# Patient Record
(demographics unavailable — no encounter records)

---

## 2024-11-12 NOTE — REVIEW OF SYSTEMS
[Fever] : no fever [Chills] : no chills [Fatigue] : no fatigue [Vision Problems] : no vision problems [Nasal Discharge] : nasal discharge [Chest Pain] : no chest pain [Palpitations] : no palpitations [Claudication] : no  leg claudication [Lower Ext Edema] : no lower extremity edema [Shortness Of Breath] : no shortness of breath [Wheezing] : no wheezing [Cough] : cough [Dyspnea on Exertion] : not dyspnea on exertion [Abdominal Pain] : no abdominal pain [Nausea] : no nausea [Constipation] : no constipation [Diarrhea] : no diarrhea [Vomiting] : no vomiting [Heartburn] : no heartburn [Melena] : no melena [Dysuria] : no dysuria [Incontinence] : no incontinence [Hesitancy] : no hesitancy [Nocturia] : no nocturia [Frequency] : no frequency [Joint Pain] : no joint pain [Joint Stiffness] : no joint stiffness [Headache] : no headache [Dizziness] : no dizziness [Fainting] : no fainting [Confusion] : confusion [Memory Loss] : no memory loss

## 2024-11-12 NOTE — ASSESSMENT
[FreeTextEntry1] : Patient basically doing well we will repeat laboratory work we will place him in the [facility] MAEVE screening program and he will be followed up after results of that are available to us he is given an influenza shot he is basically highly functional still playing softball he will start an exercise program he does have a stationary bike he has been advised to pedal this for approximately 30 minutes a day at least 5 days a week and to cut back on his diet and avoid sweets and fats and again we will follow-up on this within the next month or so time of visit 35 minutes

## 2024-11-12 NOTE — HISTORY OF PRESENT ILLNESS
[FreeTextEntry1] : Hypertension hyperlipidemia gout peripheral neuropathy [de-identified] : Patient seen in follow-up today on multiple issues also has had a lingering upper respiratory infection states for about the last 6 to 8 weeks which is improving but still he has a postnasal drip he does have a past history of GERD he is inquiring about being screened for obstructive sleep apnea as that he says it runs in his family and he certainly has some of the characteristics of people with MAEVE has been following with neurology regarding his peripheral neuropathy he has had multiple tests regarding amyloidosis including a cardiac scan which is negative a fat biopsy has been recommended to him but he is not sure that he would like to do this he is concerned about his weight and would like to lose some weight wonders if Ozempic might be the answer

## 2024-11-12 NOTE — PHYSICAL EXAM
[No Acute Distress] : no acute distress [Well Nourished] : well nourished [Well Developed] : well developed [Well-Appearing] : well-appearing [Normal Sclera/Conjunctiva] : normal sclera/conjunctiva [PERRL] : pupils equal round and reactive to light [Normal Outer Ear/Nose] : the outer ears and nose were normal in appearance [Normal Oropharynx] : the oropharynx was normal [No Lymphadenopathy] : no lymphadenopathy [No Respiratory Distress] : no respiratory distress  [No Accessory Muscle Use] : no accessory muscle use [Clear to Auscultation] : lungs were clear to auscultation bilaterally [Normal Rate] : normal rate  [Regular Rhythm] : with a regular rhythm [No Edema] : there was no peripheral edema [Soft] : abdomen soft [Non Tender] : non-tender [Non-distended] : non-distended [Normal Supraclavicular Nodes] : no supraclavicular lymphadenopathy [Normal Posterior Cervical Nodes] : no posterior cervical lymphadenopathy [Normal Anterior Cervical Nodes] : no anterior cervical lymphadenopathy [No CVA Tenderness] : no CVA  tenderness [No Rash] : no rash [Coordination Grossly Intact] : coordination grossly intact [No Focal Deficits] : no focal deficits

## 2024-12-09 NOTE — HISTORY OF PRESENT ILLNESS
[FreeTextEntry1] : The patient is an 87-year-old man who underwent a home sleep study, which was recommended due to a family history of sleep apnea. The patient reported no specific sleep-related symptoms but occasionally experiences heartburn at night, which is managed with medication as needed. The patient denies any episodes of sleepwalking, sleep talking, or acting out dreams. He experiences nocturnal awakenings twice per night to urinate and reports feeling slightly groggy upon waking in the morning. The patient does not nap regularly during the day and consumes one cup of coffee in the morning.  The home sleep study conducted on November 21, 2024, revealed moderate obstructive sleep apnea with an apnea-hypopnea index (AHI) of 21 events per hour. The patient spent 398 minutes asleep out of 468 minutes in bed. The patient does not feel excessively sleepy during the day and has an Bowlegs Sleepiness Scale score of 7. The patient is active, playing 80 to 90 games of softball a year, and has no significant daytime fatigue that affects his activities.  Sleep Schedule: - Time to bed: 11:00 PM - Wake up time: Between 7:00 AM and 7:30 AM - Nocturnal awakenings: Twice to urinate - Daytime sleepiness:  Bowlegs 7  Relevant Medications: - Medication for excess acid (heartburn) - No CPAP or other sleep apnea treatments currently in use  Past Medical History: - Coronary artery disease with three stents placed in December 2020 - High cholesterol - High blood pressure - Hearing impairment  Social History: - Never smoked - Consumes alcohol: Approximately four beers per week - Former , no marijuana use  Review of Systems: - Cardiovascular: History of coronary artery disease; no current chest pain - Respiratory: Normal pulmonary function test; prior lung infection treated with antibiotics - Gastrointestinal: Occasional heartburn - Musculoskeletal: Lower back pain, longstanding - Rest of review of systems were negative  Relevant data: - 2024-11-21: Home sleep study, total sleep time 398 minutes, time in bed 468 minutes, AHI 21, indicating moderate obstructive sleep apnea - Pulmonary Function Test: FEV1 102%, TLC 77%, DLCO 95%, indicating normal lung function  Family History: - Family history of sleep apnea

## 2024-12-09 NOTE — PLAN
[TextEntry] : The patient is an 87-year-old male with a past medical history of coronary artery disease, high cholesterol, and high blood pressure, who presented for evaluation of sleep apnea.  Moderate Obstructive Sleep Apnea: The patient has moderate MAEVE as diagnosed by the sleep study. The patient's clinical history and examination are supportive of these findings.  - APAP therapy was recommended to prevent long-term cardiovascular complications and improve sleep quality. - Discussed the use of APAP, including the types of masks available and the importance of nightly use. - Arrangements made for delivery and setup by a DME company. - Discussed potential benefits of APAP therapy, including reduced daytime grogginess. - The patient was hesitant with the PAP therapy, may need to explore other options such as oral appliances.   Follow up: The patient is advised to follow up in three months to review CPAP usage data and assess treatment efficacy.  The patient expressed understanding and agreement with the plan as outlined above and accepts responsibility to be compliant with any recommended testing, treatment, and follow-up visits. All relevant questions and concerns were addressed.   45 minutes of time were spent on the encounter. Medical records were reviewed, including but not limited to outpatient, sleep center, hospital records, laboratory data, and diagnostic imaging studies. Greater than 50% of the face-to-face encounter time was spent on counseling and/or coordination of care.

## 2024-12-09 NOTE — PHYSICAL EXAM
[TextEntry] : Constitutional: no acute distress   HEENT: normal oropharynx, Mallampati Class: II, + PND  Neck: normal appearance, no neck mass  Cardiac: normal rate/rhythm, normal s1, s2  Pulmonary: no resp distress, clear to auscultation bilaterally, no r/r/w Chest: no abnormalities Musculoskeletal: normal gait  Extremities: no clubbing, no cyanosis, no edema  Skin: normal color/ pigmentation  Psychiatric: oriented x3, normal affect

## 2025-03-14 NOTE — PHYSICAL EXAM
[de-identified] : Left wrist -Negative tenderness over the ECU tendon dorsally over the distal ulna, negative ECU Synergy test. No subluxation of the ECU tendon visible with pronosupination - There is directly tenderness over the ulnar fovea or TFCC.   - No DRUJ instability when tested and compared to the contralateral side, tested in pronation, neutral, and supination -  No tenderness over the LT interval negative LT ballottement test.  .  No pain with resisted or passive  pronosupination.   - No pain with power  position.  No tenderness over the SL interval anatomical snuffbox.or radial wrist - patient Is able make a full composite fist.   He has a negative Tinel's, negative Phalen's test, negative Durkan's test No Tinel's at the elbow over the ulnar nerve, no subluxation of the ulnar nerve with flexion extension No significant atrophy of the thenar or hypothenar musculature Digit warm well-perfused He reports diffuse numbness throughout his hand. No pain over CMC joint No tenderness over the STT joint Negative CMC grind [de-identified] : Three-view x-ray right hand including AP lateral and oblique were taken today and personally viewed these demonstrate mild degenerative changes of the first CMC joint

## 2025-03-14 NOTE — ASSESSMENT
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  Has been told he has carpal tunnel syndrome in the past.  He does report he was scheduled for surgery with another provider for carpal tunnel surgery. -He does not have any reproducible carpal tunnel syndrome symptoms on examination today.  He has an EMG from 2023 demonstrating cubital and carpal tunnel syndrome.  He reports he did not have any symptoms until a few months ago.  His symptoms he describes today are pain he points to the ulnar aspect of his wrist over his TFCC and he does have tenderness over this region as well. -We discussed the complexity of neuropathy especially in the setting of underlying neuropathy with EMG showing peripheral nerve compression as well.  He reports he received an injection by another provider 2 to 3 weeks ago.  He is unsure what he was injected with or the exact location. -At this time I would like to obtain the records from his prior provider to further provide better care for this patient, to see if he had a steroid injection into his carpal tunnel versus remainder of his wrist -He does not report any nighttime symptoms he is not frequently waking up at night,and is difficult to discern if his symptoms are coming from his underlying peripheral neuropathy  -We discussed surgery on his carpal tunnel not alleviate his ulnar wrist pain -At this time he is going to work with hand therapy, hand therapy prescription placed today.  He will follow-up in 4 weeks for repeat evaluation.  I recommend wearing her brace at night   35 minutes total time spent with the patient, including reviewing imaging, prior providers notes, relevant documentation, and history.  over 50% was spent directly counseling patient and  coordination of care.

## 2025-03-14 NOTE — HISTORY OF PRESENT ILLNESS
[FreeTextEntry1] : 87-year-old male presenting for evaluation right hand pain.  He has a complex medical history.  He was diagnosed with diffuse polyneuropathy has hand numbness bilaterally as well as foot numbness.  He has been evaluated extensively by a neurologist.  He did have a EMG in November 2023 demonstrating diffuse peripheral neuropathy that is idiopathic in nature as well as right-sided cubital tunnel and carpal tunnel.  He reports he has not had significant symptoms in his right hand or wrist until the past few months.  He has been experiencing significant pain that is alleviated by Aleve.  He points to the ulnar aspect of his wrist and reports the pain is worse with twisting type of motions.  He also has pain in his volar wrist.  He is trialed a wrist brace which does help his pain.  He does report diffuse numbness bilaterally throughout his hand.  He is 87 he lives home alone his wife passed away in 2018.  He reports that he is seen another provider, throughout the history does reveal that he was injected with something into his carpal tunnel he believes 3 weeks ago.  He does not report significant relief from the injection at this time.  He has not yet tried therapy.  He reports he is active fever play softball.

## 2025-04-10 NOTE — ASSESSMENT
[FreeTextEntry1] : Mr. Walsh is an 87-year-old with longstanding progressive sensory ataxia/polyneuropathy, carpal tunnel syndrome and low back pain.  He presents with subacute progressive bilateral wrist and hand pain and right hand weakness.  The symptoms are likely due to carpal tunnel syndrome.  I look forward to reviewing his wrist MRIs.  Assuming he undergoes carpal tunnel release, I request that adventitial tissue be sent for Congo red staining.  Regarding his sensorimotor polyneuropathy, I recommended a whole genome study to exclude RFC 1 mutation or other etiology.  I also requested a copy of his recent echo from Dr. Beto Hull's office for review regarding concerns regarding an infiltrative process.  Further management will depend upon these results and his clinical course.

## 2025-04-10 NOTE — HISTORY OF PRESENT ILLNESS
[FreeTextEntry1] : Mr. Jono Walsh returned to the office having been last seen on August 7, 2024.  He is an 87-year-old right-handed patient was diagnosed with polyneuropathy 2 decades ago when he presented with bilateral foot numbness.  This had gradually worsened over time.  He had difficulty feeling his feet and is unsteady.  He only experienced right hand numbness when talking on the telephone.  He denied weakness or cramping.  He complained of nocturia x2 and longstanding partial erectile dysfunction.  He had chronic low back pain without sciatica.  He is hard of hearing, a hereditary trait.  Blood tests were notable for normal B12 and folate levels.  TSH was 4.48.  Hemoglobin A1c was 6.  Creatinine was 1.74.  Nerve conduction studies and electromyography revealed a severe chronic axonal sensory motor polyneuropathy.  There was electrophysiologic evidence of median and ulnar neuropathies at the right wrist and elbow respectively.  MRI of the lumbar spine revealed multilevel spondylosis.  A comprehensive serologic evaluation was notable for a positive double-stranded DNA.  Immunoglobulin studies and TTR were negative.  A hereditary neuropathy panel was negative.  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  He was rheumatology consultation found no evidence of lupus.  At his May 2024 visit, he reported poor balance.  He denied weakness or postural lightheadedness.  He complained of urinary frequency.  He was diagnosed with nonaggressive prostate cancer.  No treatment was rendered.  He had numbness of both feet.  He experienced fluctuating numbness in his hands when he was talking on the phone or while sleeping.  At his August 2024 visit, he reported longstanding debilitating morning back pain and stiffness.  An MRI of the lumbar spine performed in 2023 revealed moderate multilevel lumbar spondylosis with multilevel degenerative disc disease and facet arthrosis resulting in lateral recess and foraminal narrowing.  He was evaluated by spinal surgeon who recommended pain management.  An echocardiogram performed in May 2022 revealed mild left ventricular hypertrophy.  Right ventricular size was normal.  I had suggested a technetium pyrophosphate scan to exclude wild-type cardiac TTR.  That study had not yet been performed.  There had been no change in his condition.  A technetium pyrophosphate scan performed in August 2024 was not consistent with TTR cardiac amyloidosis.  We discussed possibly a fat pad biopsy.  That study was not performed.  Since December 2024, he he complains of increasing pain and stiffness in his right more than left wrist and hands.  This occurs while sleeping and driving.  He was evaluated by Dr. Owens who injected his right wrist with lidocaine and steroids with some improvement.  He was subsequently seen by Dr. Jose A Vasquez who ordered MRIs of both wrists because of concern regarding TFCC.  He complains of right hand weakness.  Past surgical history is notable for cholecystectomy 2 shoulder procedures, left hand Dupuytren's contracture repair and cataract extractions.  He suffers from hypertension, prediabetes hyperlipidemia, coronary artery disease status post stenting and renal insufficiency.  There is no history of congestive heart failure, atrial fibrillation, pulmonary, hepatic, gastrointestinal, thyroid, hematologic or cerebrovascular disease.  He has no allergies.  Medications include metoprolol, amlodipine, torsemide, allopurinol, clopidogrel and atorvastatin.  He is a non-smoker and social drinker.  He is .  Family history is notable for a mother with stroke and a father with lung cancer.

## 2025-04-10 NOTE — CONSULT LETTER
[Dear  ___] : Dear  [unfilled], [Consult Letter:] : I had the pleasure of evaluating your patient, [unfilled]. [Please see my note below.] : Please see my note below. [Consult Closing:] : Thank you very much for allowing me to participate in the care of this patient.  If you have any questions, please do not hesitate to contact me. [Sincerely,] : Sincerely, [FreeTextEntry3] : Sergio Machuca MD\par   [DrMeir  ___] : Dr. DUQUE [DrMeir ___] : Dr. DUQUE

## 2025-04-10 NOTE — PHYSICAL EXAM
[FreeTextEntry1] : Constitutional:  Patient was well-developed, well-nourished and in no acute distress.   Head:  Normocephalic, atraumatic. Tympanic membranes were not examined.   Neck:  Supple with full range of motion.   Cardiovascular:  Cardiac rhythm was regular without murmur. There were no carotid bruits. Peripheral pulses were full and symmetric.   Respiratory:  Lungs were clear.   Abdomen:  Soft and nontender.   Spine:  Nontender.   Skin:  There were no rashes.   NEUROLOGICAL EXAMINATION:  Mental Status: Patient was alert and oriented. Speech was fluent. There was no dysarthria.   Cranial Nerves:   II: He could finger count bilaterally. Pupils were surgical but reactive. Visual fields were full.  Fundi were not examined.  III, IV, VI:  Eye movements were full without nystagmus.   V: Facial sensation was intact.   VII: Facial strength was normal.   VIII: Hearing was diminished bilaterally.  IX, X: Palatal movement was normal. Phonation was normal.   XI: Sternocleidomastoids and trapezii were normal.   XII: Tongue was midline and movements normal. There was no lingual atrophy or fasciculations.   Motor Examination: Muscle bulk, tone and strength were normal except for atrophy of the right thenar eminence with weakness of thumb abduction.  There was no tremor.  Sensory Examination: Vibration sense was absent in the feet and diminished at the knees.  Joint position sense was impaired in the toes.  There was shading of pinprick in a stocking glove distribution.  There was diminished pinprick in his right 1st, 2nd and 3rd fingers.  There were Tinel signs at both wrists.  Reflexes: DTRs were 1 at the biceps, 1+ at the knees and absent elsewhere.  Plantar Responses: Plantar responses were flexor.   Coordination/Cerebellar Function: There was no dysmetria on finger to nose or heel to shin testing.   Gait/Stance: Gait was normal.  He mildly swayed on Romberg testing.  Tandem was unsteady.

## 2025-04-10 NOTE — REVIEW OF SYSTEMS
[Numbness] : numbness [Tingling] : tingling [Abnormal Sensation] : an abnormal sensation [Difficulty Walking] : difficulty walking

## 2025-04-23 NOTE — ASSESSMENT
[FreeTextEntry1] : 1.  Right wrist carpal tunnel  - He has an EMG from 2023 this demonstrates cubital tunnel as well as carpal tunnel syndrome.  He does not have significant symptoms of this however he has a background severe unknown/idiopathic peripheral neuropathy.  Given the patient's age timelines.  He does have atrophy of his thenar compartment as well.   - I spoken with his neurologist, Dr. Machuca. he has been evaluated for amyloid for several years.  He is also known this patient he does think that there is atrophy that he has been seen progress since 2023 specifically in the thenar region.  He does have a positive Tinel's at this examination today.  In addition his prior hand surgeon had given him an injection in January.  He did report some degree of symptomatic relief from the carpal tunnel injection - I reviewed with carpal tunnel release would entail with Yamilex.  He has had a similar conversation with his prior hand surgeon as well.  Given the severity, the patient's age as well as the background peripheral neuropathy the main goal of the surgery would make sure that continued denervation does not persist.  He may not recover significant mount of function of his hand, and certainly the numbness especially with the background peripheral neuropathy is very on predictable if this will improve at all..  Jono understands this.  After speaking with his neurologist as well he would like a biopsy of the synovium to aid with his several year workup he has been undergoing for amyloid.  We will plan to send this for [place] red stain. - Details of surgery outlined in detail of right carpal tunnel release.  Risks of surgery including but not limited to infection, blood loss, damage to nerves blood vessels, tendons, muscle in the area, the risk of recurrence, risk of pillar pain, scar tenderness.  Stiffness.  In addition the pain that he is having of the TFCC region will not be improved and could in fact worsened due to the required positioning and the swelling in the area.  He understands that the numbness he has is likely not reversible with the carpal tunnel release especially in the setting of the background severe peripheral neuropathy. - This could medically help him significantly as well with the amyloid workup he has undergone extensively for - Plan for procedure right carpal tunnel release, right synovial biopsy, he verbally consented to procedure today any verbalized understanding of risk benefits alternatives the procedure he understands that continued nonoperative management is a valid management at this time.  Informed consent to be signed the day of the procedure - He has a history of cardiac disease.  He will require holding his Plavix for 5 to 7 days if this can be safely done.  He will follow-up with his primary care and cardiologist to make sure he is medically optimized for the procedure.  -His EMG from 2023 does not demonstrate significant left-sided symptoms especially of his peripheral neuropathy.  He did ask about his left-sided carpal tunnel today.  He does have examination of this.  We did discuss we can obtain a new EMG I would like him to see how he does from the surgery first to see if it causes him any significant symptom improvement and weakness of the left sided after sufficient time and healing is occurred.  He wants to play baseball and golf this summer.  2.  Bilateral TFCC injuries/tearing degenerative type - We discussed these are degenerative nature.  It is causing him significant symptoms at this time.  We discussed that degenerative tears of the TFCC we do not repair due to the intrinsic blood supply in this region I do not think he would benefit from this at this time.  He has been continuously lifting weights.  I did recommend activity modification yet again.  He does not state the therapy is helping that much.  He has been intermittently wearing braces.  I think this will improve the time.  I do not want to inject his right TFCC today as he is going to undergo surgery within the month.  He did request to have his left side.  This will help with both diagnostic and therapeutic purposes to see the degree of pain relief he experiences from this.  He tolerated this well he did have improvement with the lidocaine.

## 2025-04-23 NOTE — HISTORY OF PRESENT ILLNESS
[FreeTextEntry1] : Patient is 87-year-old male presenting for follow-up evaluation of his bilateral wrist pain. He is here for MRI review of both wrists.  He had requested MRIs due to ulnar wrist pain and concern for TFCC injuries.  He reports no significant improvement since his prior his visit.  I have also spoken with his neurologist over the phone and I have reviewed his prior physicians notes for his treating hand surgeon.  3/14/2025 he has a complex medical history. He was diagnosed with diffuse polyneuropathy has hand numbness bilaterally as well as foot numbness. He has been evaluated extensively by a neurologist. He did have a EMG in November 2023 demonstrating diffuse peripheral neuropathy that is idiopathic in nature as well as right-sided cubital tunnel and carpal tunnel. He reports he has not had significant symptoms in his right hand or wrist until the past few months. He has been experiencing significant pain that is alleviated by Aleve. He points to the ulnar aspect of his wrist and reports the pain is worse with twisting type of motions. He also has pain in his volar wrist. He is trialed a wrist brace which does help his pain. He does report diffuse numbness bilaterally throughout his hand. He is 87 he lives home alone his wife passed away in 2018. He reports that he is seen another provider, throughout the history does reveal that he was injected with something into his carpal tunnel he believes 3 weeks ago. He does not report significant relief from the injection at this time. He has not yet tried therapy. He reports he is active fever play softball.

## 2025-04-23 NOTE — PHYSICAL EXAM
[de-identified] : Right wrist -Negative tenderness over the ECU tendon dorsally over the distal ulna, negative ECU Synergy test. No subluxation of the ECU tendon visible with pronosupination - There is directly tenderness over the ulnar fovea or TFCC.   - No DRUJ instability when tested and compared to the contralateral side, tested in pronation, neutral, and supination -  No tenderness over the LT interval negative LT ballottement test.  .  No pain with resisted or passive  pronosupination.   - No pain with power  position.  No tenderness over the SL interval anatomical snuffbox.or radial wrist - patient Is able make a full composite fist.   He has a positive Tinel'scausing electrical shock, negative Phalen's test, negative Durkan's test No Tinel's at the elbow over the ulnar nerve, no subluxation of the ulnar nerve with flexion extension His entirety of his hand is numb at baseline from his peripheral neuropathy There does appear to be mild atrophy of the thenar region compared to the contralateral side, no hypothenar atrophy Digit warm well-perfused  His left wrist he has tenderness over the TFCC.  Stable DRUJ exam with no subluxation palpable.  He does have a positive Tinel's but negative Durkan's test at the carpal tunnel of the left hand  [de-identified] : Bilateral Wrist MRIs were reviewed in office today.  PROCEDURE DATE: 04/12/2025  INTERPRETATION: Clinical indication: Left wrist pain Impression: There is partial tearing of the triangular fibrocartilage complex articular disc and radial attachment. Moderate degenerative changes of the thumb base.  PROCEDURE DATE: 04/12/2025 INTERPRETATION: Clinical indication: Right wrist pain Impression: Partial tearing of the triangular fibrocartilage complex. There is moderate extensor carpi ulnaris tendinosis with focal intrasubstance tearing at the level of the wrist.

## 2025-04-23 NOTE — PROCEDURE
[FreeTextEntry1] :  Verbal consent given after informed discussion regarding risks, benefits and alternatives to a corticosteroid injection.  Risks including infection, flare reaction, pain, and skin changes were discussed in detail.  The left wrist was confirmed to be the correct site.  The skin was anesthetized using of the chloride solution spray and prepped with alcohol.  An injection was performed using 6 mg of betamethasone mixed with 1 cc of 1% lidocaine into the left wrist TFCC.  The patient tolerated the procedure well.  Advised to ice and elevate tonight and that the full effects can take 48 to 72 hours.

## 2025-06-04 NOTE — ASSESSMENT
[FreeTextEntry1] :  - Pathology results reviewed, negative for amyloid.  He is going to send this to his neurologist as well who is within the system -He states his hand feels significant better than before surgery at this time point already.  There is no issues with the wound/incision -He would like to play baseball and begin golfing.  I did recommend he wait another week to protect the wound while it heals further. -His left TFCC feels better from the injection still, his right does feel slightly better -For wound care I recommended, patient may now shower and allow soap and water to run over the incision, advised to avoid soaking or submerging the wound for 1 more week - He is encouraged to continue range of motion -We discussed expectations and reasonable timelines. Discussed that  strength and pinch strength may continue to improve for several months postoperatively - The numbness will be less predictable in the setting of background peripheral neuropathy in all extremities - He does have send on the left, we discussed we are going to defer this until after the summer as it is not causing his significant symptoms at this time and may just require observation -

## 2025-06-04 NOTE — HISTORY OF PRESENT ILLNESS
[FreeTextEntry1] : Jono is here for follow-up, 2 weeks status post carpal tunnel release, flexor tendon synovectomy as well as biopsy of the transverse carpal ligament.  He is doing well.  He reports his hand feels significantly better than before surgery feels that he has better motion and there is slight improvement in the numbness.  Pathology results reviewed, this is negative for amyloid

## 2025-06-04 NOTE — PHYSICAL EXAM
[de-identified] : Right hand and wrist -Incision well-healed, no erythema redness or drainage. Nylon sutures in place.  With verbal consent given, sutures removed, no dehiscence of the wound  -Able to make full composite fist able to touch palm to fingertip. -Digits warm well-perfused,  - Reports slight improvement in baseline numbness -Mild stiffness with wrist range of motion -Firing APB/obb with good strength -FDS FDP EDC intact, FPL intact EPL intact

## 2025-06-25 NOTE — PHYSICAL EXAM
[de-identified] : Right hand and wrist -Incision well-healed, no erythema redness or drainage.  Very mild pillar pain -Able to make full composite fist able to touch palm to fingertip. -Digits warm well-perfused,  - Reports continued improvement in numbness in the entirety of the hand - Full flexion extension of the wrist with good strength - Unchanged atrophy of the thenar compartment.  Firing APB -FDS FDP EDC intact, FPL intact EPL intact  Left wrist - Numbness in the hand mostly in the thumb index middle and ring finger, small degree of numbness in the small finger - Very mild atrophy not as significant as the contralateral side - Very mild Tinel's, no worsening of the numbness with Durkan's and Phalen's test - No tenderness over the ulnar nerve Able make a full composite fist Pain over the TFCC is improved.  Very mild pain with pronosupination and is resisted

## 2025-06-25 NOTE — HISTORY OF PRESENT ILLNESS
[FreeTextEntry1] : Jono is here for follow-up, status post right carpal tunnel release, flexor tendon synovectomy as well as biopsy of the transverse carpal ligament.  Surgery date May19.  He states he is going back to playing baseball.  He would like to go back to golf.  He is going to remain in the next coming weeks for flyfishing.  He reports continued improvement in numbness.  There is background numbness from his peripheral neuropathy.  He is having occasional left-sided symptoms.  His TFCC pain has improved bilaterally.  He has been working on exercises on his own

## 2025-06-25 NOTE — ASSESSMENT
[FreeTextEntry1] :  - He has a follow-up appointment with Dr. chatman with neurology at the end of the summer for continued workup for his peripheral neuropathy He states he does have numbness that is improved, there is a mild baseline numbness but this is improved since prior to surgery.  The tingling sensation and occasional burning sensation is completely resolved He has returned to baseball, he is going to return to golf next week for my standpoint he may participate in all activities without restriction - For his TFCC pain I recommended continuing the exercises given for therapy.  Brace only as needed if it does flare.  We can trial repeat injection he did have relief from the left-sided injection - For the incision I advised him to massage the scar several times a day -We discussed expectations and reasonable timelines. Discussed that  strength and pinch strength may continue to improve for several months postoperatively - The numbness will be less predictable in the setting of background peripheral neuropathy in all extremities, although he has shown improvement with the procedure - For his left side he states that does not cause him significant distress at this time.  He wants to do all activities over the summer.  He does not have significant night symptoms, there is baseline numbness.  He is going to follow-up in September after he is back from his fishing trip for further evaluation